# Patient Record
Sex: FEMALE | Race: WHITE | URBAN - METROPOLITAN AREA
[De-identification: names, ages, dates, MRNs, and addresses within clinical notes are randomized per-mention and may not be internally consistent; named-entity substitution may affect disease eponyms.]

---

## 2020-03-17 ENCOUNTER — NEW PATIENT (OUTPATIENT)
Dept: URBAN - METROPOLITAN AREA CLINIC 51 | Facility: CLINIC | Age: 75
End: 2020-03-17
Payer: MEDICARE

## 2020-03-17 DIAGNOSIS — H25.813 COMBINED FORMS OF AGE-RELATED CATARACT, BILATERAL: Primary | ICD-10-CM

## 2020-03-17 PROCEDURE — 92004 COMPRE OPH EXAM NEW PT 1/>: CPT | Performed by: OPTOMETRIST

## 2020-03-17 PROCEDURE — 92134 CPTRZ OPH DX IMG PST SGM RTA: CPT | Performed by: OPTOMETRIST

## 2020-03-17 ASSESSMENT — KERATOMETRY
OD: 44.62
OS: 44.19

## 2020-03-17 ASSESSMENT — VISUAL ACUITY
OD: 20/20
OS: 20/20

## 2020-03-17 ASSESSMENT — INTRAOCULAR PRESSURE
OS: 15
OD: 15

## 2021-03-26 ENCOUNTER — FOLLOW UP ESTABLISHED (OUTPATIENT)
Dept: URBAN - METROPOLITAN AREA CLINIC 51 | Facility: CLINIC | Age: 76
End: 2021-03-26
Payer: MEDICARE

## 2021-03-26 DIAGNOSIS — H43.313 VITREOUS MEMBRANES AND STRANDS, BILATERAL: ICD-10-CM

## 2021-03-26 DIAGNOSIS — H52.4 PRESBYOPIA: ICD-10-CM

## 2021-03-26 DIAGNOSIS — H16.223 KERATOCONJUNCTIVITIS SICCA, BILATERAL: ICD-10-CM

## 2021-03-26 PROCEDURE — 92134 CPTRZ OPH DX IMG PST SGM RTA: CPT | Performed by: OPTOMETRIST

## 2021-03-26 PROCEDURE — 92014 COMPRE OPH EXAM EST PT 1/>: CPT | Performed by: OPTOMETRIST

## 2021-03-26 ASSESSMENT — KERATOMETRY
OD: 44.68
OS: 44.30

## 2021-03-26 ASSESSMENT — INTRAOCULAR PRESSURE
OS: 17
OD: 16

## 2021-03-26 ASSESSMENT — VISUAL ACUITY
OD: 20/25
OS: 20/25

## 2022-07-15 ENCOUNTER — OFFICE VISIT (OUTPATIENT)
Dept: URBAN - METROPOLITAN AREA CLINIC 51 | Facility: CLINIC | Age: 77
End: 2022-07-15
Payer: MEDICARE

## 2022-07-15 DIAGNOSIS — H25.813 COMBINED FORMS OF AGE-RELATED CATARACT, BILATERAL: Primary | ICD-10-CM

## 2022-07-15 DIAGNOSIS — H43.313 VITREOUS MEMBRANES AND STRANDS, BILATERAL: ICD-10-CM

## 2022-07-15 DIAGNOSIS — H16.223 KERATOCONJUNCTIVITIS SICCA, BILATERAL: ICD-10-CM

## 2022-07-15 DIAGNOSIS — H52.4 PRESBYOPIA: ICD-10-CM

## 2022-07-15 PROCEDURE — 92134 CPTRZ OPH DX IMG PST SGM RTA: CPT | Performed by: OPTOMETRIST

## 2022-07-15 PROCEDURE — 99214 OFFICE O/P EST MOD 30 MIN: CPT | Performed by: OPTOMETRIST

## 2022-07-15 ASSESSMENT — INTRAOCULAR PRESSURE
OS: 17
OD: 18

## 2022-07-15 ASSESSMENT — VISUAL ACUITY
OD: 20/20
OS: 20/25

## 2022-07-15 ASSESSMENT — KERATOMETRY
OS: 44.13
OD: 44.45

## 2022-07-15 NOTE — IMPRESSION/PLAN
Impression: Combined forms of age-related cataract, bilateral Plan: Discussed cataracts with patient. Discussed treatment options. Surgical treatment is recommended. Surgical risks and benefits discussed. Patient elects surgical treatment. Recommend surgery OU, OS first. 
Patient is candidate/interested in toric, standard, LenSx and ORA. Patient is not interested in MF IOL 2' cost.
Aim OD: [place]   Aim OS: [place] Patient understands that she will need glasses for reading and all near/computer work following surgery, and may need full time glasses for best possible vision after cataract surgery.

## 2022-07-15 NOTE — IMPRESSION/PLAN
Impression: Keratoconjunctivitis sicca, bilateral Plan: Eyes do appear dry which accounts for complaints. Dryness can cause fluctuations in vision. Continue Refresh AT's at least 2-3 times per day or more OU. Can use more often as needed. Previously discussed WC for 8-10 minutes as needed. Dryness may flare up following cataract surgery.

## 2022-07-15 NOTE — IMPRESSION/PLAN
Impression: Presbyopia Plan: Do not recommend updating SRx at this time 2' cataracts. Can update SRx in the future following cataract surgery if needed.

## 2022-07-15 NOTE — IMPRESSION/PLAN
Impression: Vitreous membranes and strands, bilateral Plan: Floaters are bothersome and visually significant to patient. Discussed floaters may appear more bothersome following cataract surgery. Can refer to retina in the future following cataract surgery for vitrectomy consult if patient wishes. Retina is attached 360 degrees. Patient to RTC ASAP if increase in floaters, flashes, or curtain or veil taking away vision.

## 2022-10-20 ENCOUNTER — ADULT PHYSICAL (OUTPATIENT)
Dept: URBAN - METROPOLITAN AREA CLINIC 51 | Facility: CLINIC | Age: 77
End: 2022-10-20
Payer: MEDICARE

## 2022-10-20 DIAGNOSIS — Z01.818 ENCOUNTER FOR OTHER PREPROCEDURAL EXAMINATION: Primary | ICD-10-CM

## 2022-10-20 DIAGNOSIS — H25.813 COMBINED FORMS OF AGE-RELATED CATARACT, BILATERAL: Primary | ICD-10-CM

## 2022-10-20 PROCEDURE — 99203 OFFICE O/P NEW LOW 30 MIN: CPT | Performed by: PHYSICIAN ASSISTANT

## 2022-10-20 RX ORDER — SERTRALINE HYDROCHLORIDE 20 MG/ML
SOLUTION, CONCENTRATE ORAL
Qty: 0 | Refills: 0 | Status: INACTIVE
Start: 2022-10-20 | End: 2022-10-20

## 2022-10-20 RX ORDER — ROSUVASTATIN CALCIUM 40 MG/1
40 MG TABLET, FILM COATED ORAL
Qty: 0 | Refills: 0 | Status: ACTIVE
Start: 2022-10-20

## 2022-10-25 ENCOUNTER — PRE-OPERATIVE VISIT (OUTPATIENT)
Dept: URBAN - METROPOLITAN AREA CLINIC 44 | Facility: CLINIC | Age: 77
End: 2022-10-25
Payer: MEDICARE

## 2022-10-25 DIAGNOSIS — H25.811 COMBINED FORMS OF AGE-RELATED CATARACT, RIGHT EYE: ICD-10-CM

## 2022-10-25 DIAGNOSIS — H52.223 REGULAR ASTIGMATISM, BILATERAL: ICD-10-CM

## 2022-10-25 DIAGNOSIS — H25.813 COMBINED FORMS OF AGE-RELATED CATARACT, BILATERAL: Primary | ICD-10-CM

## 2022-10-25 PROCEDURE — 99204 OFFICE O/P NEW MOD 45 MIN: CPT | Performed by: OPHTHALMOLOGY

## 2022-10-25 NOTE — IMPRESSION/PLAN
Impression: Combined forms of age-related cataract, bilateral: H25.813. Plan: Discussed cataract diagnosis with the patient. Discussed and reviewed treatment options for cataracts. Surgical treatment is required for cataracts. Risks and benefits of surgical treatment were discussed and understood. Patient elects surgical treatment. Recommend surgery OU,OS    first. PLAN STD LENS, DISTANCE AIM, NO UPGRADES,NO  JESSY, PLAN LRI. PATIENT UNDERSTANDS THE NEED FOR GLASSES POST-OP FOR BEST OVER ALL VISION. Discussed limitations to vision post op due to K/SICCA, PVD . Will Hernandez [address] . Will Hernandez   If first eye doing well, ok to proceed with second eye surgery

## 2022-11-01 ENCOUNTER — SURGERY (OUTPATIENT)
Dept: URBAN - METROPOLITAN AREA SURGERY 19 | Facility: SURGERY | Age: 77
End: 2022-11-01
Payer: MEDICARE

## 2022-11-01 DIAGNOSIS — H52.223 REGULAR ASTIGMATISM, BILATERAL: ICD-10-CM

## 2022-11-01 DIAGNOSIS — H25.813 COMBINED FORMS OF AGE-RELATED CATARACT, BILATERAL: Primary | ICD-10-CM

## 2022-11-01 PROCEDURE — 66984 XCAPSL CTRC RMVL W/O ECP: CPT | Performed by: OPHTHALMOLOGY

## 2022-11-02 ENCOUNTER — POST-OPERATIVE VISIT (OUTPATIENT)
Dept: URBAN - METROPOLITAN AREA CLINIC 51 | Facility: CLINIC | Age: 77
End: 2022-11-02
Payer: MEDICARE

## 2022-11-02 DIAGNOSIS — Z48.810 ENCOUNTER FOR SURGICAL AFTERCARE FOLLOWING SURGERY ON A SENSE ORGAN: Primary | ICD-10-CM

## 2022-11-02 PROCEDURE — 99024 POSTOP FOLLOW-UP VISIT: CPT | Performed by: OPTOMETRIST

## 2022-11-02 ASSESSMENT — INTRAOCULAR PRESSURE: OS: 16

## 2022-11-02 NOTE — IMPRESSION/PLAN
Impression: S/P Cataract Extraction by phacoemulsification with IOL placement; LRI (Limbal Relaxing Incision) OS - 1 Day. Encounter for surgical aftercare following surgery on a sense organ  Z48.810. Plan: Doing well POD1. Swelling and inflammation noted on today's examination. Discussed with patient that vision will continue to improve as swelling and inflammation resolves. Superior epi defect noted which accounts for irritation patient is experiencing. Discussed the importance of diligent AT's for comfort. Recommend ATs every 1-2 hours while awake x the next 2 days, then 4+ times per day. RTC as scheduled for 1 week post op appointment.

## 2022-11-09 ENCOUNTER — POST-OPERATIVE VISIT (OUTPATIENT)
Dept: URBAN - METROPOLITAN AREA CLINIC 51 | Facility: CLINIC | Age: 77
End: 2022-11-09
Payer: MEDICARE

## 2022-11-09 DIAGNOSIS — Z48.810 ENCOUNTER FOR SURGICAL AFTERCARE FOLLOWING SURGERY ON A SENSE ORGAN: Primary | ICD-10-CM

## 2022-11-09 PROCEDURE — 99024 POSTOP FOLLOW-UP VISIT: CPT | Performed by: OPTOMETRIST

## 2022-11-09 ASSESSMENT — INTRAOCULAR PRESSURE
OS: 15
OD: 15

## 2022-11-09 ASSESSMENT — VISUAL ACUITY
OS: 20/25
OD: 20/30

## 2022-11-09 NOTE — IMPRESSION/PLAN
Impression: S/P Cataract Extraction by phacoemulsification with IOL placement; LRI (Limbal Relaxing Incision) OS - 8 Days. Encounter for surgical aftercare following surgery on a sense organ  Z48.810. Plan: Doing well 1WK PO. Swelling and inflammation has resolved since previous examination. Clear and centered IOL OS. Continue AT's at least 2+ times per day or more OU. Restrictions no longer apply until after next surgery. Okay to proceed with second eye as scheduled, patient prefers. RTC as scheduled for cataract surgery OD.

## 2022-11-16 ENCOUNTER — POST-OPERATIVE VISIT (OUTPATIENT)
Dept: URBAN - METROPOLITAN AREA CLINIC 51 | Facility: CLINIC | Age: 77
End: 2022-11-16
Payer: MEDICARE

## 2022-11-16 DIAGNOSIS — Z96.1 PRESENCE OF INTRAOCULAR LENS: Primary | ICD-10-CM

## 2022-11-16 PROCEDURE — 99024 POSTOP FOLLOW-UP VISIT: CPT | Performed by: OPTOMETRIST

## 2022-11-16 ASSESSMENT — INTRAOCULAR PRESSURE: OD: 14

## 2022-11-16 NOTE — IMPRESSION/PLAN
Impression: S/P Cataract Extraction by phacoemulsification with IOL placement; LRI (Limbal Relaxing Incision) OD - 1 Day. Presence of intraocular lens  Z96.1. Plan: Doing well POD1. Swelling and inflammation noted on today's examination. Discussed with patient that vision will continue to improve as swelling and inflammation resolves. Iver Arnold slightly elevated and causing irritation. Attempted to place medication further into puncta. Restrictions apply for 1 week. Recommend ATs when eyes feel dry/gritty, itchy, or water. Can trial OTC reading glasses. RTC as scheduled for final post op appointment/SRx.

## 2022-12-14 ENCOUNTER — POST-OPERATIVE VISIT (OUTPATIENT)
Dept: URBAN - METROPOLITAN AREA CLINIC 51 | Facility: CLINIC | Age: 77
End: 2022-12-14
Payer: MEDICARE

## 2022-12-14 DIAGNOSIS — Z96.1 PRESENCE OF INTRAOCULAR LENS: Primary | ICD-10-CM

## 2022-12-14 PROCEDURE — 99024 POSTOP FOLLOW-UP VISIT: CPT | Performed by: OPTOMETRIST

## 2022-12-14 ASSESSMENT — INTRAOCULAR PRESSURE
OS: 16
OD: 16

## 2022-12-14 ASSESSMENT — VISUAL ACUITY
OD: 20/25
OS: 20/25

## 2022-12-14 NOTE — IMPRESSION/PLAN
Impression: S/P Cataract Extraction by phacoemulsification with IOL placement; LRI (Limbal Relaxing Incision) OD - 29 Days. Presence of intraocular lens  Z96.1. Plan: Healed. Noticing PVD/floater OS, discussed with patient. Floater should improve with time, if no improvement can consider PPVIT. Discussed OTC readers, vs old glasses for reading, vs new SRx. Pt will trial readers and old glasses. 

RTC July 2023 x CE

## 2023-07-14 ENCOUNTER — OFFICE VISIT (OUTPATIENT)
Dept: URBAN - METROPOLITAN AREA CLINIC 51 | Facility: CLINIC | Age: 78
End: 2023-07-14
Payer: MEDICARE

## 2023-07-14 DIAGNOSIS — H04.209 EPIPHORA: ICD-10-CM

## 2023-07-14 DIAGNOSIS — H43.313 VITREOUS MEMBRANES AND STRANDS, BILATERAL: Primary | ICD-10-CM

## 2023-07-14 DIAGNOSIS — Z96.1 PRESENCE OF INTRAOCULAR LENS: ICD-10-CM

## 2023-07-14 PROCEDURE — 92134 CPTRZ OPH DX IMG PST SGM RTA: CPT | Performed by: OPTOMETRIST

## 2023-07-14 PROCEDURE — 99214 OFFICE O/P EST MOD 30 MIN: CPT | Performed by: OPTOMETRIST

## 2023-07-14 ASSESSMENT — VISUAL ACUITY
OD: 20/20
OS: 20/20

## 2023-07-14 ASSESSMENT — INTRAOCULAR PRESSURE
OD: 14
OS: 14

## 2023-07-14 NOTE — IMPRESSION/PLAN
Impression: Vitreous membranes and strands, bilateral: H43.313. Plan: Retina is attached 360 degrees with no signs of any retinal holes, tears, or detachments observed on today's dilated examination. Educated patient on process of vitreous syneresis. Discussed usually the brain neuro-adapts to floaters and they become less bothersome with time. Sometimes floaters are still noticeable and if effecting ADLs, a PPVIT can be performed. Reviewed PPVIT surgery (r/b/a) with patient. Patient to RTC ASAP if increase in floaters, flashes, or curtain or veil taking away vision. Refer for PPVIT- Retina.

## 2023-07-14 NOTE — IMPRESSION/PLAN
Impression: Epiphora: H04.209. Plan: Educated patient on dry eye condition, symptoms and relief. Discussed tearing that patient is noticing is likely due to dryness and direct air flow from ceiling fans/vents. Lower eyelid retraction will likely contribute to symptoms. 
Recommend AT's at least 2+ times per day or more

## 2023-08-01 ENCOUNTER — OFFICE VISIT (OUTPATIENT)
Dept: URBAN - METROPOLITAN AREA CLINIC 51 | Facility: CLINIC | Age: 78
End: 2023-08-01
Payer: MEDICARE

## 2023-08-01 DIAGNOSIS — H43.313 VITREOUS MEMBRANES AND STRANDS, BILATERAL: Primary | ICD-10-CM

## 2023-08-01 PROCEDURE — 99214 OFFICE O/P EST MOD 30 MIN: CPT | Performed by: OPHTHALMOLOGY

## 2023-08-01 PROCEDURE — 92134 CPTRZ OPH DX IMG PST SGM RTA: CPT | Performed by: OPHTHALMOLOGY

## 2023-08-01 RX ORDER — PREDNISOLONE ACETATE 10 MG/ML
1 % SUSPENSION/ DROPS OPHTHALMIC
Qty: 5 | Refills: 0 | Status: ACTIVE
Start: 2023-08-01

## 2023-08-01 RX ORDER — OFLOXACIN 3 MG/ML
0.3 % SOLUTION/ DROPS OPHTHALMIC
Qty: 5 | Refills: 0 | Status: INACTIVE
Start: 2023-08-01 | End: 2023-08-07

## 2023-08-01 ASSESSMENT — INTRAOCULAR PRESSURE
OS: 12
OD: 14

## 2023-08-16 ENCOUNTER — ADULT PHYSICAL (OUTPATIENT)
Dept: URBAN - METROPOLITAN AREA CLINIC 51 | Facility: CLINIC | Age: 78
End: 2023-08-16
Payer: MEDICARE

## 2023-08-16 DIAGNOSIS — Z01.818 ENCOUNTER FOR OTHER PREPROCEDURAL EXAMINATION: Primary | ICD-10-CM

## 2023-08-16 DIAGNOSIS — H43.313 VITREOUS MEMBRANES AND STRANDS, BILATERAL: ICD-10-CM

## 2023-08-16 PROCEDURE — 99213 OFFICE O/P EST LOW 20 MIN: CPT | Performed by: PHYSICIAN ASSISTANT

## 2023-08-29 ENCOUNTER — POST-OPERATIVE VISIT (OUTPATIENT)
Dept: URBAN - METROPOLITAN AREA CLINIC 51 | Facility: CLINIC | Age: 78
End: 2023-08-29
Payer: MEDICARE

## 2023-08-29 DIAGNOSIS — Z48.810 ENCOUNTER FOR SURGICAL AFTERCARE FOLLOWING SURGERY ON A SENSE ORGAN: Primary | ICD-10-CM

## 2023-08-29 PROCEDURE — 99024 POSTOP FOLLOW-UP VISIT: CPT | Performed by: OPTOMETRIST

## 2023-08-29 ASSESSMENT — INTRAOCULAR PRESSURE: OS: 13

## 2023-09-12 ENCOUNTER — POST-OPERATIVE VISIT (OUTPATIENT)
Dept: URBAN - METROPOLITAN AREA CLINIC 51 | Facility: CLINIC | Age: 78
End: 2023-09-12
Payer: MEDICARE

## 2023-09-12 DIAGNOSIS — H43.313 VITREOUS MEMBRANES AND STRANDS, BILATERAL: Primary | ICD-10-CM

## 2023-09-12 PROCEDURE — 99024 POSTOP FOLLOW-UP VISIT: CPT | Performed by: OPHTHALMOLOGY

## 2023-09-12 ASSESSMENT — INTRAOCULAR PRESSURE
OS: 19
OD: 19

## 2023-10-18 ENCOUNTER — ADULT PHYSICAL (OUTPATIENT)
Dept: URBAN - METROPOLITAN AREA CLINIC 51 | Facility: CLINIC | Age: 78
End: 2023-10-18
Payer: MEDICARE

## 2023-10-18 DIAGNOSIS — H43.313 VITREOUS MEMBRANES AND STRANDS, BILATERAL: ICD-10-CM

## 2023-10-18 DIAGNOSIS — Z01.818 ENCOUNTER FOR OTHER PREPROCEDURAL EXAMINATION: Primary | ICD-10-CM

## 2023-10-18 PROCEDURE — 99213 OFFICE O/P EST LOW 20 MIN: CPT | Performed by: PHYSICIAN ASSISTANT

## 2023-10-31 ENCOUNTER — POST-OPERATIVE VISIT (OUTPATIENT)
Dept: URBAN - METROPOLITAN AREA CLINIC 51 | Facility: CLINIC | Age: 78
End: 2023-10-31
Payer: MEDICARE

## 2023-10-31 DIAGNOSIS — Z48.810 ENCOUNTER FOR SURGICAL AFTERCARE FOLLOWING SURGERY ON A SENSE ORGAN: Primary | ICD-10-CM

## 2023-10-31 PROCEDURE — 99024 POSTOP FOLLOW-UP VISIT: CPT | Performed by: OPTOMETRIST

## 2023-10-31 ASSESSMENT — INTRAOCULAR PRESSURE: OD: 10

## 2024-01-02 ENCOUNTER — POST-OPERATIVE VISIT (OUTPATIENT)
Dept: URBAN - METROPOLITAN AREA CLINIC 51 | Facility: CLINIC | Age: 79
End: 2024-01-02
Payer: MEDICARE

## 2024-01-02 DIAGNOSIS — H43.313 VITREOUS MEMBRANES AND STRANDS, BILATERAL: Primary | ICD-10-CM

## 2024-01-02 PROCEDURE — 99024 POSTOP FOLLOW-UP VISIT: CPT | Performed by: OPHTHALMOLOGY

## 2024-01-02 ASSESSMENT — INTRAOCULAR PRESSURE
OS: 9
OD: 11

## 2024-08-14 ENCOUNTER — OFFICE VISIT (OUTPATIENT)
Dept: URBAN - METROPOLITAN AREA CLINIC 51 | Facility: CLINIC | Age: 79
End: 2024-08-14
Payer: MEDICARE

## 2024-08-14 DIAGNOSIS — H43.313 VITREOUS MEMBRANES AND STRANDS, BILATERAL: Primary | ICD-10-CM

## 2024-08-14 DIAGNOSIS — H04.123 TEAR FILM INSUFFICIENCY OF BILATERAL LACRIMAL GLANDS: ICD-10-CM

## 2024-08-14 PROCEDURE — 92014 COMPRE OPH EXAM EST PT 1/>: CPT | Performed by: OPTOMETRIST

## 2024-08-14 ASSESSMENT — VISUAL ACUITY
OD: 20/20
OS: 20/20

## 2024-08-14 ASSESSMENT — KERATOMETRY
OD: 44.50
OS: 43.75

## 2024-08-14 ASSESSMENT — INTRAOCULAR PRESSURE
OD: 20
OS: 21